# Patient Record
Sex: FEMALE | Race: WHITE | NOT HISPANIC OR LATINO | Employment: PART TIME | URBAN - METROPOLITAN AREA
[De-identification: names, ages, dates, MRNs, and addresses within clinical notes are randomized per-mention and may not be internally consistent; named-entity substitution may affect disease eponyms.]

---
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
## 2020-11-07 ENCOUNTER — OFFICE VISIT (OUTPATIENT)
Dept: URGENT CARE | Facility: CLINIC | Age: 20
End: 2020-11-07
Payer: COMMERCIAL

## 2020-11-07 VITALS
SYSTOLIC BLOOD PRESSURE: 115 MMHG | TEMPERATURE: 99.1 F | RESPIRATION RATE: 16 BRPM | DIASTOLIC BLOOD PRESSURE: 60 MMHG | BODY MASS INDEX: 36.11 KG/M2 | WEIGHT: 172 LBS | OXYGEN SATURATION: 100 % | HEIGHT: 58 IN | HEART RATE: 88 BPM

## 2020-11-07 DIAGNOSIS — J35.8 TONSIL STONE: ICD-10-CM

## 2020-11-07 DIAGNOSIS — J02.9 ACUTE PHARYNGITIS, UNSPECIFIED ETIOLOGY: Primary | ICD-10-CM

## 2020-11-07 PROBLEM — L73.2 HIDRADENITIS SUPPURATIVA: Status: ACTIVE | Noted: 2017-01-13

## 2020-11-07 LAB — S PYO AG THROAT QL: NEGATIVE

## 2020-11-07 PROCEDURE — 87147 CULTURE TYPE IMMUNOLOGIC: CPT | Performed by: FAMILY MEDICINE

## 2020-11-07 PROCEDURE — 87070 CULTURE OTHR SPECIMN AEROBIC: CPT | Performed by: FAMILY MEDICINE

## 2020-11-07 PROCEDURE — 99213 OFFICE O/P EST LOW 20 MIN: CPT | Performed by: FAMILY MEDICINE

## 2020-11-09 ENCOUNTER — TELEPHONE (OUTPATIENT)
Dept: URGENT CARE | Facility: CLINIC | Age: 20
End: 2020-11-09

## 2020-11-09 DIAGNOSIS — J02.0 STREP PHARYNGITIS: Primary | ICD-10-CM

## 2020-11-09 LAB — BACTERIA THROAT CULT: ABNORMAL

## 2020-11-09 RX ORDER — PENICILLIN V POTASSIUM 500 MG/1
500 TABLET ORAL EVERY 12 HOURS
Qty: 20 TABLET | Refills: 0 | Status: SHIPPED | OUTPATIENT
Start: 2020-11-09 | End: 2020-11-19

## 2021-01-23 ENCOUNTER — OFFICE VISIT (OUTPATIENT)
Dept: URGENT CARE | Facility: CLINIC | Age: 21
End: 2021-01-23
Payer: COMMERCIAL

## 2021-01-23 VITALS — OXYGEN SATURATION: 99 % | TEMPERATURE: 98 F | HEART RATE: 112 BPM

## 2021-01-23 DIAGNOSIS — R11.0 NAUSEA: Primary | ICD-10-CM

## 2021-01-23 PROCEDURE — 99213 OFFICE O/P EST LOW 20 MIN: CPT | Performed by: NURSE PRACTITIONER

## 2021-01-23 RX ORDER — ONDANSETRON 4 MG/1
4 TABLET, ORALLY DISINTEGRATING ORAL EVERY 6 HOURS PRN
Qty: 10 TABLET | Refills: 0 | Status: SHIPPED | OUTPATIENT
Start: 2021-01-23

## 2021-01-23 RX ORDER — ARIPIPRAZOLE 5 MG/1
5 TABLET ORAL DAILY
COMMUNITY
Start: 2021-01-14

## 2021-01-23 NOTE — PATIENT INSTRUCTIONS
Zofran every 6 hours as needed for nausea  Wait 30 min after taking nausea medication to eat or drink  Increase fluid intake   Eat bland diet and advance as tolerated  Tylenol/Motrin as needed    Acute Nausea and Vomiting   WHAT YOU NEED TO KNOW:   Acute nausea and vomiting start suddenly, worsen quickly, and last a short time  DISCHARGE INSTRUCTIONS:   Return to the emergency department if:   · You see blood in your vomit or your bowel movements  · You have sudden, severe pain in your chest and upper abdomen after hard vomiting or retching  · You have swelling in your neck and chest      · You are dizzy, cold, and thirsty and your eyes and mouth are dry  · You are urinating very little or not at all  · You have muscle weakness, leg cramps, and trouble breathing  · Your heart is beating much faster than normal      · You continue to vomit for more than 48 hours  Contact your healthcare provider if:   · You have frequent dry heaves (vomiting but nothing comes out)  · Your nausea and vomiting does not get better or go away after you use medicine  · You have questions or concerns about your condition or treatment  Medicines: You may need any of the following:  · Medicines  may be given to calm your stomach and stop your vomiting  You may also need medicines to help you feel more relaxed or to stop nausea and vomiting caused by motion sickness  · Gastrointestinal stimulants  are used to help empty your stomach and bowels  This may help decrease nausea and vomiting  · Take your medicine as directed  Contact your healthcare provider if you think your medicine is not helping or if you have side effects  Tell him or her if you are allergic to any medicine  Keep a list of the medicines, vitamins, and herbs you take  Include the amounts, and when and why you take them  Bring the list or the pill bottles to follow-up visits   Carry your medicine list with you in case of an emergency  Prevent or manage acute nausea and vomiting:   · Do not drink alcohol  Alcohol may upset or irritate your stomach  Too much alcohol can also cause acute nausea and vomiting  · Control stress  Headaches due to stress may cause nausea and vomiting  Find ways to relax and manage your stress  Get more rest and sleep  · Drink more liquids as directed  Vomiting can lead to dehydration  It is important to drink more liquids to help replace lost body fluids  Ask your healthcare provider how much liquid to drink each day and which liquids are best for you  Your provider may recommend that you drink an oral rehydration solution (ORS)  ORS contains water, salts, and sugar that are needed to replace the lost body fluids  Ask what kind of ORS to use, how much to drink, and where to get it  · Eat smaller meals, more often  Eat small amounts of food every 2 to 3 hours, even if you are not hungry  Food in your stomach may decrease your nausea  · Talk to your healthcare provider before you take over-the-counter (OTC) medicines  These medicines can cause serious problems if you use certain other medicines, or you have a medical condition  You may have problems if you use too much or use them for longer than the label says  Follow directions on the label carefully  Follow up with your healthcare provider as directed:  Write down your questions so you remember to ask them during your follow-up visits  © Copyright [address] Information is for End User's use only and may not be sold, redistributed or otherwise used for commercial purposes  All illustrations and images included in CareNotes® are the copyrighted property of A D A M , Inc  or [facility] Reinier Zhang   The above information is an  only  It is not intended as medical advice for individual conditions or treatments   Talk to your doctor, nurse or pharmacist before following any medical regimen to see if it is safe and effective for you

## 2021-01-23 NOTE — PROGRESS NOTES
[address] [facility]        NAME: Rocío Hutchins is a 21 y o  female  : 2000    MRN: [id number]  DATE: 2021  TIME: 4:00 PM    Assessment and Plan   Nausea [R11 0]  1  Nausea  ondansetron (ZOFRAN-ODT) 4 mg disintegrating tablet         Patient Instructions   Zofran every 6 hours as needed for nausea  Wait 30 min after taking nausea medication to eat or drink  Increase fluid intake   Eat bland diet and advance as tolerated  Tylenol/Motrin as needed      Follow up with PCP in 3-5 days  Proceed to  ER if symptoms worsen  Chief Complaint     Chief Complaint   Patient presents with    Nausea     1 NIGHT, SENT HOME FROM WORK         History of Present Illness       Patient is a 21year old female presenting with nausea and upset stomach that started this morning  She left work early and was advised to come to urgent care  Denies fever, chills, cough, SOB, V/D  No medications for symptom relief  Review of Systems   Review of Systems   Constitutional: Negative for activity change, chills and fever  Respiratory: Negative for cough and shortness of breath  Gastrointestinal: Positive for nausea  Negative for abdominal pain, diarrhea and vomiting  Skin: Negative for rash  Neurological: Negative for dizziness, seizures and headaches           Current Medications       Current Outpatient Medications:     ARIPiprazole (ABILIFY) 5 mg tablet, Take 5 mg by mouth daily, Disp: , Rfl:     ondansetron (ZOFRAN-ODT) 4 mg disintegrating tablet, Take 1 tablet (4 mg total) by mouth every 6 (six) hours as needed for nausea or vomiting, Disp: 10 tablet, Rfl: 0    Current Allergies     Allergies as of 2021 - Reviewed 2021   Allergen Reaction Noted    Sulfa antibiotics Hives 08/10/2016            The following portions of the patient's history were reviewed and updated as appropriate: allergies, current medications, past family history, past medical history, past social history, past surgical history and problem list      Past Medical History:   Diagnosis Date    Anxiety     per grandmother, pt has panic attacks     Asthma     Depression     Hidradenitis suppurativa     Paronychia of toe     Psychiatric disorder     anxiety, depression       Past Surgical History:   Procedure Laterality Date    [place] EXC SWEAT GLAND LESN AXILL,SIMPL Bilateral 12/11/2017    Procedure: EXCISION HIDRADENITIS AXILLARY WITH COMPLEX CLOSURE;  Surgeon: Jere Youssef MD;  Location: [facility] OR;  Service: Plastics       Family History   Problem Relation Age of Onset    No Known Problems Mother     Heart disease Father     Kidney disease Father     Liver disease Father     Early death Father     Breast cancer Maternal Grandmother     Skin cancer Maternal Grandmother     Breast cancer Paternal Grandmother          Medications have been verified  Objective   Pulse (!) 112   Temp 98 °F (36 7 °C) (Temporal)   SpO2 99%        Physical Exam     Physical Exam  Vitals signs reviewed  Constitutional:       General: She is awake  She is not in acute distress  Appearance: Normal appearance  HENT:      Head: Normocephalic  Cardiovascular:      Rate and Rhythm: Regular rhythm  Tachycardia present  Heart sounds: Normal heart sounds, S1 normal and S2 normal    Pulmonary:      Effort: Pulmonary effort is normal       Breath sounds: Normal breath sounds  No decreased breath sounds, wheezing, rhonchi or rales  Abdominal:      General: Bowel sounds are normal       Palpations: Abdomen is soft  Tenderness: There is generalized abdominal tenderness  Skin:     General: Skin is warm and moist    Neurological:      General: No focal deficit present  Mental Status: She is alert, oriented to person, place, and time and easily aroused  Psychiatric:         Behavior: Behavior is cooperative

## 2021-01-23 NOTE — LETTER
January 23, 2021     Patient: Rocío Hutchins   YOB: 2000   Date of Visit: 1/23/2021       To Whom it May Concern:    Jose Ibrahimlynda was seen in my clinic on 1/23/2021  She may return to work on 1/24/2021  If you have any questions or concerns, please don't hesitate to call  Sincerely,          BE AILIN WISE        CC: Parisa Singleton

## 2021-04-22 ENCOUNTER — OFFICE VISIT (OUTPATIENT)
Dept: URGENT CARE | Facility: CLINIC | Age: 21
End: 2021-04-22
Payer: COMMERCIAL

## 2021-04-22 VITALS
RESPIRATION RATE: 16 BRPM | SYSTOLIC BLOOD PRESSURE: 112 MMHG | OXYGEN SATURATION: 100 % | DIASTOLIC BLOOD PRESSURE: 62 MMHG | HEART RATE: 85 BPM | HEIGHT: 59 IN | TEMPERATURE: 98 F | WEIGHT: 170 LBS | BODY MASS INDEX: 34.27 KG/M2

## 2021-04-22 DIAGNOSIS — K52.9 NONINFECTIOUS GASTROENTERITIS, UNSPECIFIED TYPE: Primary | ICD-10-CM

## 2021-04-22 PROCEDURE — 99213 OFFICE O/P EST LOW 20 MIN: CPT | Performed by: NURSE PRACTITIONER

## 2021-04-22 NOTE — LETTER
April 22, 2021     Patient: Efraín Mayo   YOB: 2000   Date of Visit: 4/22/2021       To Whom it May Concern:    Jayshree Batista was seen in my clinic on 4/22/2021  Please excuse from work today due to illness  May stay home tomorrow if not feeling better, otherwise may return to work  If you have any questions or concerns, please don't hesitate to call  Sincerely,          BE AILIN WISE        CC: Parisa Gomez

## 2021-04-22 NOTE — PATIENT INSTRUCTIONS
--Frequent fluids, advance to bland solids as tolerated (bananas, rice, crackers, yogurt, etc)  --OTC Pepto-bismol as needed  --Avoid further sushi, other potentially exacerbating foods, especially spicy, greasy, acidic  --Seek re-evaluation if no improvement/resolution over the next 2-3 days  --Go to ER for worsening pain, vomiting, fever/chills

## 2021-04-22 NOTE — PROGRESS NOTES
[email or web address]        NAME: Ellen King is a 24 y o  female  : 2000    MRN: [id number]  DATE: 2021  TIME: 11:11 AM    Assessment and Plan   Noninfectious gastroenteritis, unspecified type [K52 9]  1  Noninfectious gastroenteritis, unspecified type       --Suspect food (sushi) induced gastroenteritis  Address per below  Patient Instructions     --Frequent fluids, advance to bland solids as tolerated (bananas, rice, crackers, yogurt, etc)  --OTC Pepto-bismol as needed  --Avoid further sushi, other potentially exacerbating foods, especially spicy, greasy, acidic  --Seek re-evaluation if no improvement/resolution over the next 2-3 days  --Go to ER for worsening pain, vomiting, fever/chills  Chief Complaint     Chief Complaint   Patient presents with    Abdominal Pain     Started last night after diner feels like she has to vomit          History of Present Illness         Here with complaints of generalized abdominal pain since last night  Described as "aching", "rolling" sensation  Waxes and wanes  Non-localized, with no radiation to back, elsewhere  Started 1-2 hours after eating raw "gas station" sushi, which she thinks was likely the cause  No other known food precipitants or infectious contacts  Some nausea, loose stools also  No fever/chills, vomiting, hematochezia/melena, urinary complaints  No bloating, burping, heartburn  No URI symptoms  Drinking adequately  Had some crackers today  Feels somewhat better  Rates pain 4/10 at present  No OTC meds taken  Denies previous GI conditions/surgeries including IBS, lactose/gluten intolerance, etc    No recent alcohol  Review of Systems   Review of Systems   Constitutional: Negative for fever  Gastrointestinal:        Per HPI   Genitourinary: Negative for difficulty urinating and dysuria  Skin: Negative for rash           Current Medications       Current Outpatient Medications:     ARIPiprazole (ABILIFY) 5 mg tablet, Take 5 mg by mouth daily, Disp: , Rfl:     ondansetron (ZOFRAN-ODT) 4 mg disintegrating tablet, Take 1 tablet (4 mg total) by mouth every 6 (six) hours as needed for nausea or vomiting, Disp: 10 tablet, Rfl: 0    Current Allergies     Allergies as of 04/22/2021 - Reviewed 04/22/2021   Allergen Reaction Noted    Sulfa antibiotics Hives 08/10/2016            The following portions of the patient's history were reviewed and updated as appropriate: allergies, current medications, past family history, past medical history, past social history, past surgical history and problem list      Past Medical History:   Diagnosis Date    Anxiety     per grandmother, pt has panic attacks     Asthma     Depression     Hidradenitis suppurativa     Paronychia of toe     Psychiatric disorder     anxiety, depression       Past Surgical History:   Procedure Laterality Date    [place] EXC SWEAT GLAND LESN AXILL,SIMPL Bilateral 12/11/2017    Procedure: EXCISION HIDRADENITIS AXILLARY WITH COMPLEX CLOSURE;  Surgeon: Raquel Coronado MD;  Location: [facility];  Service: Plastics       Family History   Problem Relation Age of Onset    No Known Problems Mother     Heart disease Father     Kidney disease Father     Liver disease Father     Early death Father     Breast cancer Maternal Grandmother     Skin cancer Maternal Grandmother     Breast cancer Paternal Grandmother          Medications have been verified  Objective   /62 (BP Location: Right arm, Patient Position: Sitting)   Pulse 85   Temp 98 °F (36 7 °C)   Resp 16   Ht 4' 11" (1 499 m)   Wt 77 1 kg (170 lb)   SpO2 100%   BMI 34 34 kg/m²   No LMP recorded  Physical Exam     Physical Exam  Constitutional:       General: She is not in acute distress  Appearance: She is not ill-appearing, toxic-appearing or diaphoretic  Eyes:      General: No scleral icterus  Cardiovascular:      Rate and Rhythm: Normal rate and regular rhythm  Pulmonary:      Effort: Pulmonary effort is normal       Breath sounds: Normal breath sounds  Abdominal:      General: Abdomen is flat  Bowel sounds are normal  There is no distension  Palpations: Abdomen is soft  There is no mass  Tenderness: There is abdominal tenderness  There is no right CVA tenderness, left CVA tenderness, guarding or rebound  Hernia: No hernia is present  Comments: Bilateral mid and upper abdominal tenderness without guarding, rebound  No lower abdominal tenderness  Negative psoas, obturator, Rovsing  Normal BS  Neurological:      Mental Status: She is alert     Psychiatric:         Mood and Affect: Mood normal

## 2021-09-08 ENCOUNTER — DOCUMENTATION (OUTPATIENT)
Dept: DENTISTRY | Facility: CLINIC | Age: 21
End: 2021-09-08

## 2021-09-08 NOTE — PROGRESS NOTES
Pt no showed for her dental appt scheduled on 9/8/2021 at 2:00pm  Pt was scheduled for periodic dental exam, adult prophy and updated radiographs  Pt's last prophy appt was on 9/16/2020       [facility]

## 2022-10-18 ENCOUNTER — ANNUAL EXAM (OUTPATIENT)
Dept: OBGYN CLINIC | Facility: CLINIC | Age: 22
End: 2022-10-18
Payer: MEDICARE

## 2022-10-18 VITALS
SYSTOLIC BLOOD PRESSURE: 122 MMHG | DIASTOLIC BLOOD PRESSURE: 82 MMHG | HEIGHT: 59 IN | WEIGHT: 186.7 LBS | BODY MASS INDEX: 37.64 KG/M2

## 2022-10-18 DIAGNOSIS — R63.5 WEIGHT GAIN: ICD-10-CM

## 2022-10-18 DIAGNOSIS — Z12.4 ENCOUNTER FOR SCREENING FOR MALIGNANT NEOPLASM OF CERVIX: ICD-10-CM

## 2022-10-18 DIAGNOSIS — Z01.419 WELL WOMAN EXAM WITH ROUTINE GYNECOLOGICAL EXAM: Primary | ICD-10-CM

## 2022-10-18 DIAGNOSIS — L68.0 HIRSUTISM: ICD-10-CM

## 2022-10-18 DIAGNOSIS — N91.2 AMENORRHEA: ICD-10-CM

## 2022-10-18 PROCEDURE — 99395 PREV VISIT EST AGE 18-39: CPT | Performed by: PHYSICIAN ASSISTANT

## 2022-10-18 PROCEDURE — G0145 SCR C/V CYTO,THINLAYER,RESCR: HCPCS | Performed by: PHYSICIAN ASSISTANT

## 2022-10-18 NOTE — PROGRESS NOTES
ASSESSMENT & PLAN:   Diagnoses and all orders for this visit:    Well woman exam with routine gynecological exam  -     Cancel: Liquid-based pap, screening  -     Liquid-based pap, screening    Encounter for screening for malignant neoplasm of cervix  -     Cancel: Liquid-based pap, screening  -     Liquid-based pap, screening    Amenorrhea  -     Follicle stimulating hormone; Future  -     hCG, quantitative; Future  -     TSH, 3rd generation with Free T4 reflex; Future  -     Prolactin; Future  -     US pelvis transabdominal only; Future    Hirsutism  -     Basic metabolic panel; Future  -     Insulin, fasting; Future  -     Glucose, fasting; Future  -     HEMOGLOBIN A1C W/ EAG ESTIMATION; Future    Weight gain  -     Basic metabolic panel; Future  -     Insulin, fasting; Future  -     Glucose, fasting; Future  -     HEMOGLOBIN A1C W/ EAG ESTIMATION; Future          The following were reviewed in today's visit: ASCCP guidelines, Gardisil vaccination, STD testing breast self exam, STD testing, exercise and healthy diet  Reviewed importance of regulation of menstrual period and use of hormonal contraception to do so for uterine protection  Patient is not amenable at this time to any intervention and states she is planning to get a hysterectomy in the future  Patient to return to office in yearly for annual exam      All questions have been answered to her satisfaction  CC:  Annual Gynecologic Examination  Chief Complaint   Patient presents with   • Gynecologic Exam       HPI: Lola Field is a 25 y o  [place] who presents for annual gynecologic examination  She has the following concerns:  Patient states she has not had a period in over a year  She had irregular periods for 5-7 years prior to that  She also reports difficulty losing weight and male pattern facial hair growth  She is sexually active with a male partner, but has never had any vaginal penetration   She used a tampon once in her teens which was painful and has not used one since  Health Maintenance:    Exercise: intermittently  Breast exams/breast awareness: yes  Diet: mostly well balanced      Past Medical History:   Diagnosis Date   • Anxiety     per grandmother, pt has panic attacks    • Asthma    • Depression    • Hidradenitis suppurativa    • Paronychia of toe    • Psychiatric disorder     anxiety, depression       Past Surgical History:   Procedure Laterality Date   • CO EXC SWEAT GLAND LESN AXILL,SIMPL Bilateral 12/11/2017    Procedure: EXCISION HIDRADENITIS AXILLARY WITH COMPLEX CLOSURE;  Surgeon: Liana Bragg MD;  Location: [facility] OR;  Service: Plastics       Past OB/Gyn History:   No LMP recorded (lmp unknown)  Last Pap: no priors : no prior  History of abnormal Pap smear: no  HPV vaccine completed: yes    Patient is  currently sexually active     STD testing: no  Current contraception: none      Family History  Family History   Problem Relation Age of Onset   • No Known Problems Mother    • Heart disease Father    • Kidney disease Father    • Liver disease Father    • Early death Father    • Breast cancer Maternal Grandmother    • Skin cancer Maternal Grandmother    • Uterine cancer Maternal Grandmother    • Breast cancer Paternal Grandmother        Family history of uterine or ovarian cancer: yes  Family history of breast cancer: yes  Family history of colon cancer: no    Social History:  Social History     Socioeconomic History   • Marital status: Single     Spouse name: Not on file   • Number of children: Not on file   • Years of education: Not on file   • Highest education level: Not on file   Occupational History   • Not on file   Tobacco Use   • Smoking status: Never Smoker   • Smokeless tobacco: Never Used   Vaping Use   • Vaping Use: Some days   • Substances: THC (vapes medical THC)   Substance and Sexual Activity   • Alcohol use: Yes     Comment: social   • Drug use: Yes     Types: Marijuana   • Sexual activity: Never Other Topics Concern   • Not on file   Social History Narrative   • Not on file     Social Determinants of Health     Financial Resource Strain: Not on file   Food Insecurity: Not on file   Transportation Needs: Not on file   Physical Activity: Not on file   Stress: Not on file   Social Connections: Not on file   Intimate Partner Violence: Not on file   Housing Stability: Not on file     Domestic violence screen: negative    Allergies: Allergies   Allergen Reactions   • Sulfa Antibiotics Hives       Medications:  No current outpatient medications on file  Review of Systems:  Review of Systems   Constitutional: Negative for chills, fever and unexpected weight change  Respiratory: Negative for shortness of breath  Cardiovascular: Negative for chest pain  Gastrointestinal: Negative for abdominal pain, diarrhea, nausea and vomiting  Skin: Negative for rash  Psychiatric/Behavioral: Negative for dysphoric mood  The patient is not nervous/anxious  Physical Exam:  /82 (BP Location: Right arm, Patient Position: Sitting, Cuff Size: Standard)   Ht 4' 11" (1 499 m)   Wt 84 7 kg (186 lb 11 2 oz)   LMP  (LMP Unknown) Comment: last period over 1 year ago   BMI 37 71 kg/m²         Physical Exam  Constitutional:       Appearance: Normal appearance  She is obese  Genitourinary:      Vulva and urethral meatus normal       No lesions in the vagina  Right Labia: No rash or lesions  Left Labia: No lesions or rash  No vaginal discharge, erythema or bleeding  Right Adnexa: not tender and no mass present  Left Adnexa: not tender and no mass present  No cervical discharge or lesion  Uterus is not tender  Uterus exam comments: Exam limited by habitus  Breasts: Breasts are symmetrical       Right: No mass, skin change or axillary adenopathy  Left: No mass, skin change or axillary adenopathy  HENT:      Head: Normocephalic and atraumatic        Comments: Chin/neck/facial hair noted, dark  Cardiovascular:      Rate and Rhythm: Normal rate and regular rhythm  Heart sounds: Normal heart sounds  No murmur heard  No friction rub  No gallop  Pulmonary:      Effort: Pulmonary effort is normal       Breath sounds: Normal breath sounds  No wheezing, rhonchi or rales  Abdominal:      General: Abdomen is flat  There is no distension  Palpations: Abdomen is soft  Tenderness: There is no abdominal tenderness  Musculoskeletal:      Cervical back: Neck supple  Lymphadenopathy:      Upper Body:      Right upper body: No axillary adenopathy  Left upper body: No axillary adenopathy  Neurological:      General: No focal deficit present  Mental Status: She is alert  Skin:     General: Skin is warm and dry  Psychiatric:         Mood and Affect: Mood normal          Behavior: Behavior normal    Vitals reviewed

## 2022-10-24 LAB
LAB AP GYN PRIMARY INTERPRETATION: NORMAL
Lab: NORMAL

## 2023-07-26 LAB — HBA1C MFR BLD HPLC: 5 %

## 2024-02-21 PROBLEM — J02.9 ACUTE PHARYNGITIS: Status: RESOLVED | Noted: 2020-11-07 | Resolved: 2024-02-21

## 2024-03-25 ENCOUNTER — TELEPHONE (OUTPATIENT)
Age: 24
End: 2024-03-25

## 2024-03-25 NOTE — TELEPHONE ENCOUNTER
Per Plastics office, pt needs to see general surgery first then plastics would do the closure    Called pt to cancel appt. Pt verbally acknowledged.

## 2024-06-17 ENCOUNTER — HOSPITAL ENCOUNTER (EMERGENCY)
Facility: HOSPITAL | Age: 24
Discharge: HOME | End: 2024-06-17
Attending: EMERGENCY MEDICINE
Payer: MEDICARE

## 2024-06-17 VITALS
HEART RATE: 97 BPM | DIASTOLIC BLOOD PRESSURE: 63 MMHG | TEMPERATURE: 97.8 F | OXYGEN SATURATION: 99 % | RESPIRATION RATE: 18 BRPM | SYSTOLIC BLOOD PRESSURE: 116 MMHG

## 2024-06-17 DIAGNOSIS — L02.412 ABSCESS OF LEFT AXILLA: Primary | ICD-10-CM

## 2024-06-17 DIAGNOSIS — Z87.2 HISTORY OF HIDRADENITIS SUPPURATIVA: ICD-10-CM

## 2024-06-17 PROCEDURE — 10061 I&D ABSCESS COMP/MULTIPLE: CPT | Performed by: EMERGENCY MEDICINE

## 2024-06-17 PROCEDURE — 99284 EMERGENCY DEPT VISIT MOD MDM: CPT | Performed by: EMERGENCY MEDICINE

## 2024-06-17 PROCEDURE — 99282 EMERGENCY DEPT VISIT SF MDM: CPT

## 2024-06-17 PROCEDURE — 96372 THER/PROPH/DIAG INJ SC/IM: CPT

## 2024-06-17 RX ORDER — DOXYCYCLINE HYCLATE 100 MG/1
100 CAPSULE ORAL 2 TIMES DAILY
Qty: 14 CAPSULE | Refills: 0 | Status: SHIPPED | OUTPATIENT
Start: 2024-06-17 | End: 2024-06-24

## 2024-06-17 RX ORDER — HYDROMORPHONE HCL/PF 1 MG/ML
0.5 SYRINGE (ML) INJECTION ONCE
Status: DISCONTINUED | OUTPATIENT
Start: 2024-06-17 | End: 2024-06-17 | Stop reason: HOSPADM

## 2024-06-17 RX ORDER — KETOROLAC TROMETHAMINE 30 MG/ML
15 INJECTION, SOLUTION INTRAMUSCULAR; INTRAVENOUS ONCE
Status: COMPLETED | OUTPATIENT
Start: 2024-06-17 | End: 2024-06-17

## 2024-06-17 RX ORDER — LIDOCAINE HYDROCHLORIDE AND EPINEPHRINE 10; 10 MG/ML; UG/ML
20 INJECTION, SOLUTION INFILTRATION; PERINEURAL ONCE
Status: COMPLETED | OUTPATIENT
Start: 2024-06-17 | End: 2024-06-17

## 2024-06-17 RX ORDER — HYDROMORPHONE HCL/PF 1 MG/ML
0.5 SYRINGE (ML) INJECTION ONCE
Status: DISCONTINUED | OUTPATIENT
Start: 2024-06-17 | End: 2024-06-17

## 2024-06-17 RX ORDER — IBUPROFEN 800 MG/1
800 TABLET ORAL EVERY 8 HOURS PRN
Qty: 12 TABLET | Refills: 0 | Status: SHIPPED | OUTPATIENT
Start: 2024-06-17

## 2024-06-17 RX ADMIN — KETOROLAC TROMETHAMINE 15 MG: 30 INJECTION, SOLUTION INTRAMUSCULAR; INTRAVENOUS at 13:28

## 2024-06-17 RX ADMIN — LIDOCAINE HYDROCHLORIDE,EPINEPHRINE BITARTRATE 20 ML: 10; .01 INJECTION, SOLUTION INFILTRATION; PERINEURAL at 13:31

## 2024-06-17 NOTE — ED PROVIDER NOTES
History  Chief Complaint   Patient presents with    Mass     Patient has hx of  Hidradenitis suppurativa, reports lump under L armpit since 6/13, has noticed that it has been fluctuating in size. Pain noted to the area     24 year old female presents with 5 day history of worsening left axilla pain.  Patient has a hx of HS with recurrent axillary abscesses and had previous left axillary surgery in 2017.  Patient reports 8/10 pain, no fever, no drainage.  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      History provided by:  Medical records and patient   used: No    Abscess  Location:  Shoulder/arm  Shoulder/arm abscess location:  L axilla  Size:  1 cm  Abscess quality: induration, painful, redness and warmth    Abscess quality: not draining, no fluctuance and not weeping    Red streaking: no    Duration:  5 days  Progression:  Worsening  Pain details:     Quality:  Pressure    Severity:  Severe    Duration:  4 days    Timing:  Constant    Progression:  Worsening  Chronicity:  Recurrent  Context: not diabetes and not injected drug use    Relieved by:  Nothing  Worsened by:  Nothing  Ineffective treatments:  Warm compresses  Associated symptoms: no fever, no nausea and no vomiting    Risk factors: prior abscess        None       Past Medical History:   Diagnosis Date    Anxiety     per grandmother, pt has panic attacks     Asthma     Depression     Hidradenitis suppurativa     Paronychia of toe     Psychiatric disorder     anxiety, depression       Past Surgical History:   Procedure Laterality Date    GA EXCISION HIDRADENITIS AXILLARY SMPL/INTRM RPR Bilateral 12/11/2017    Procedure: EXCISION HIDRADENITIS AXILLARY WITH COMPLEX CLOSURE;  Surgeon: Gonzalez Burgos MD;  Location:  MAIN OR;  Service: Plastics       Family History   Problem Relation Age of Onset    No Known Problems Mother     Heart disease Father      Kidney disease Father     Liver disease Father     Early death Father     Breast cancer Maternal Grandmother     Skin cancer Maternal Grandmother     Uterine cancer Maternal Grandmother     Breast cancer Paternal Grandmother      I have reviewed and agree with the history as documented.    E-Cigarette/Vaping    E-Cigarette Use Current Some Day User      E-Cigarette/Vaping Substances    THC Yes vapes medical THC     Social History     Tobacco Use    Smoking status: Never    Smokeless tobacco: Never   Vaping Use    Vaping status: Some Days    Substances: THC (vapes medical THC)   Substance Use Topics    Alcohol use: Not Currently     Comment: social    Drug use: Yes     Types: Marijuana       Review of Systems   Constitutional:  Negative for chills and fever.   Gastrointestinal:  Negative for nausea and vomiting.   Skin:  Negative for rash and wound.   Neurological:  Positive for numbness. Negative for weakness.   All other systems reviewed and are negative.      Physical Exam  Physical Exam  Vitals and nursing note reviewed.   Constitutional:       General: She is in acute distress.      Appearance: She is well-developed. She is not toxic-appearing.   HENT:      Head: Normocephalic.      Right Ear: External ear normal.      Left Ear: External ear normal.      Nose: Nose normal.      Mouth/Throat:      Mouth: Mucous membranes are moist.      Pharynx: Oropharynx is clear.   Eyes:      General: Lids are normal.      Extraocular Movements: Extraocular movements intact.      Pupils: Pupils are equal, round, and reactive to light.   Pulmonary:      Effort: Pulmonary effort is normal. No respiratory distress.   Musculoskeletal:         General: No deformity. Normal range of motion.      Cervical back: Normal range of motion and neck supple.   Skin:     General: Skin is warm and dry.      Findings: Abscess and erythema present.             Comments: Moderate amount of scar tissue in left axilla   Neurological:      Mental  Status: She is alert and oriented to person, place, and time.   Psychiatric:         Mood and Affect: Mood is anxious.         Thought Content: Thought content normal.         Cognition and Memory: Cognition normal.         Judgment: Judgment normal.         Vital Signs  ED Triage Vitals   Temperature Pulse Respirations Blood Pressure SpO2   06/17/24 1145 06/17/24 1145 06/17/24 1145 06/17/24 1145 06/17/24 1145   97.8 °F (36.6 °C) 97 18 116/63 99 %      Temp Source Heart Rate Source Patient Position - Orthostatic VS BP Location FiO2 (%)   06/17/24 1145 06/17/24 1145 06/17/24 1145 06/17/24 1145 --   Oral Monitor Sitting Right arm       Pain Score       06/17/24 1328       8           Vitals:    06/17/24 1145   BP: 116/63   Pulse: 97   Patient Position - Orthostatic VS: Sitting         Visual Acuity      ED Medications  Medications   HYDROmorphone (DILAUDID) injection 0.5 mg (0.5 mg Intramuscular Not Given 6/17/24 1335)   ketorolac (TORADOL) injection 15 mg (15 mg Intramuscular Given 6/17/24 1328)   lidocaine-epinephrine (XYLOCAINE/EPINEPHRINE) 1 %-1:100,000 injection 20 mL (20 mL Infiltration Given by Other 6/17/24 1331)       Diagnostic Studies  Results Reviewed       None                   No orders to display              Procedures  Incision and drain    Date/Time: 6/17/2024 1:49 PM    Performed by: Conchita Rizzo DO  Authorized by: Conchita Rizzo DO  Universal Protocol:  Consent: Verbal consent obtained.  Risks and benefits: risks, benefits and alternatives were discussed  Consent given by: patient  Patient identity confirmed: verbally with patient    Patient location:  ED  Location:     Type:  Abscess    Size:  1cm    Location:  Upper extremity    Upper extremity location: left axilla.  Pre-procedure details:     Skin preparation:  Betadine  Anesthesia (see MAR for exact dosages):     Anesthesia method:  Local infiltration    Local anesthetic:  Lidocaine 1% WITH epi  Procedure details:     Complexity:  Simple     Needle aspiration: no      Incision types:  Single straight    Scalpel blade:  11    Approach:  Open    Incision depth:  Subcutaneous    Wound management:  Probed and deloculated and irrigated with saline    Irrigation with saline:  100cc    Drainage:  Purulent    Drainage amount:  Moderate    Wound treatment:  Wound left open    Packing materials:  None  Post-procedure details:     Patient tolerance of procedure:  Tolerated well, no immediate complications           ED Course                                             Medical Decision Making  24-year-old female presents with left axillary pain.  Patient has history of hydroadenitis suppurativa.  Differential diagnosis includes was limited to acute skin abscess, cellulitis, lymphadenitis.    Problems Addressed:  Abscess of left axilla: acute illness or injury  History of hidradenitis suppurativa: chronic illness or injury    Amount and/or Complexity of Data Reviewed  Independent Historian:      Details: Significant other at bedside who provide history  External Data Reviewed: notes.     Details: Notes from prior plastic surgery evaluations reviewed by me    Risk  Prescription drug management.  Risk Details: 24-year-old female presents with left axillary pain.  Patient was found to have a small area of abscess overlying a pre-existing area of scar tissue.  Patient tolerated local anesthesia and linear incision.  Moderate amount of purulent drainage expressed from abscess collection.  Patient tolerated well.  Recommend warm soaks, no packing placed as the area of fluctuance was very shallow and overlying firm scar tissue limiting the ability to place packing.  Discussed signs symptoms to return to the emergency department.  Will initiate oral antibiotics and ibuprofen as needed for pain.             Disposition  Final diagnoses:   Abscess of left axilla   History of hidradenitis suppurativa     Time reflects when diagnosis was documented in both MDM as applicable and  the Disposition within this note       Time User Action Codes Description Comment    6/17/2024  1:51 PM Conchita Rizzo [L02.412] Abscess of left axilla     6/17/2024  1:52 PM Conchita Rizzo [Z87.2] History of hidradenitis suppurativa           ED Disposition       ED Disposition   Discharge    Condition   Stable    Date/Time   Mon Jun 17, 2024  1:51 PM    Comment   Parisa West discharge to home/self care.                   Follow-up Information    None         Discharge Medication List as of 6/17/2024  1:56 PM        START taking these medications    Details   doxycycline hyclate (VIBRAMYCIN) 100 mg capsule Take 1 capsule (100 mg total) by mouth 2 (two) times a day for 7 days, Starting Mon 6/17/2024, Until Mon 6/24/2024, Normal      ibuprofen (MOTRIN) 800 mg tablet Take 1 tablet (800 mg total) by mouth every 8 (eight) hours as needed for mild pain or moderate pain Take with food, Starting Mon 6/17/2024, Normal                 PDMP Review       None            ED Provider  Electronically Signed by             Conchita Rizzo DO  06/17/24 6337

## 2024-06-17 NOTE — Clinical Note
Parisa West was seen and treated in our emergency department on 6/17/2024.                Diagnosis:     Parisa  may return to work on return date.    She may return on this date: 06/21/2024         If you have any questions or concerns, please don't hesitate to call.      Conchita Rizzo, DO    ______________________________           _______________          _______________  Hospital Representative                              Date                                Time

## 2024-06-20 ENCOUNTER — TELEPHONE (OUTPATIENT)
Dept: PLASTIC SURGERY | Facility: CLINIC | Age: 24
End: 2024-06-20

## 2024-07-16 ENCOUNTER — OFFICE VISIT (OUTPATIENT)
Dept: PLASTIC SURGERY | Facility: CLINIC | Age: 24
End: 2024-07-16
Payer: MEDICARE

## 2024-07-16 DIAGNOSIS — Z87.2 H/O HIDRADENITIS SUPPURATIVA: Primary | ICD-10-CM

## 2024-07-16 DIAGNOSIS — L73.2 HIDRADENITIS SUPPURATIVA OF LEFT AXILLA: ICD-10-CM

## 2024-07-16 PROCEDURE — 99203 OFFICE O/P NEW LOW 30 MIN: CPT

## 2024-07-16 NOTE — PROGRESS NOTES
[facility]astic and [facility]  [address], [address], [place] [address]  [phone]    Patient Identification: Parisa West is a 24 y.o. female     History of Present Illness: The patient is a 24 y.o.  year-old female  who presents to the office for consult regarding left axilla recurrent hidradenitis. Patient is previously known to our practice. She had hidradenitis excision of bilateral axilla 12/11/2017 by Dr. Burgos.   Patient states she has an area of recurrent hidradenitis within the left axilla. Since 2017, the area has occasionally drained fluid. Pt states she was [facility] at [facility] for an incision and drainage of left axilla abscess. I&D was successful and she was discharged home on antibiotics. The area is tender and causes discomfort when wearing a bra. Patient would like to discuss her options at this time. She is interested in excision of hidradenitis and remaining scar. Patient denies factors contributing to poor wound healing such as diabetes, tobacco use, chronic steroid use, immunotherapy/chemotherapy drug use and blood thinning mediations. She denies any cardiac or pulmonary history. She admits to medical marijuana use.     Patient has no complaints at this time.    Past Medical History:   Diagnosis Date    Anxiety     per grandmother, pt has panic attacks     Asthma     Depression     Hidradenitis suppurativa     Paronychia of toe     Psychiatric disorder     anxiety, depression        Review of Systems  Constitutional: Denies fevers, chills or pain.  Skin: Denies any warmth, erythema, edema or mucopurulent drainage.     Physical Exam  Constitutional:AAOx3, well-developed, no distress. Pt is cooperative and pleasant.  Eyes: Pupils are equal, round, and reactive to light. EOM in tact. Clear conjunctiva  Cardiovascular: Normal rate, regular rhythm.  Pulmonary/Chest: Effort normal and breath sounds normal. No respiratory distress.  Neuro: Gait in tact. Reflexes and  motor strength normal and symmetric. Cranial nerved 2-12 grossly intact  Psychiatric: Has appropriate behavior and thought process.   Extremities: Full ROM, no gross abnormalities.  Skin: Right axilla scar well healed, flat, no hyperpigmentation or wounds. Left axilla scar is raised, medial inferior portion of scar with a cystic structure. No active drainage. Superior to axilla is a small cystic structure as well that is inflamed. See media    Assessment and Plan:  The patient is an 24 y.o.  year-old female who presents to the office for consultation for recurrent hidradenitis. S/p Excision Hidradenitis Axillary With Complex Closure - Bilateral  on 12/11/2017 by Dr. Burgos        Diagnosis related to today's visit include:  History of hidradenitis suppurativa  Hidradenitis suppurativa of left axilla    -At today's visit discussed patient's history and bilateral axillas were examined. Right axilla scar is well healed. Left axillary scar has a cystic structure along the medial inferior portion. Scar is raised and firm.  Discussed options of care with the patient, she is interested in revision of left axillary scar and excision of recurrent hidradenitis. Discussed risks of the procedure such as poor wound healing, scarring, and risk of hidradenitis reoccurrence once again. Pt understands, would like to proceed.  -Patient is a candidate for excision of recurrent hidradenitis in a hospital setting. Surgical details were reviewed with her and post-op care and restrictions.   -The patient signed a consent form and reviewed the risks of the procedure, such as bleeding, infection, asymmetry, contour deformity, scarring, wound healing problems, anesthesia risks, as well as possibility of subsequent procedures.   -Our office will contact the patient for scheduling. She is to return for a pre-op appt.  -Patient was advised she would have to refrain from medical marijuana use before and after procedure.   -The patient is to call  the office with any questions or concerns. All of the patient's questions were answered at this time and they agree with the plan of care.      Ivis Alcantar PA-C  [facility] Plastic and Reconstructive Surgery

## 2024-07-25 ENCOUNTER — TELEPHONE (OUTPATIENT)
Dept: PLASTIC SURGERY | Facility: CLINIC | Age: 24
End: 2024-07-25

## 2024-07-30 ENCOUNTER — TELEPHONE (OUTPATIENT)
Dept: PLASTIC SURGERY | Facility: CLINIC | Age: 24
End: 2024-07-30

## 2024-07-30 NOTE — TELEPHONE ENCOUNTER
Spoke with patient to schedule her for surgery with Dr. Dover. Patient wanted to wait until she talks to her employer about taking the time off. Gave her my direct number to call me when she is ready.

## 2024-08-05 ENCOUNTER — PREP FOR PROCEDURE (OUTPATIENT)
Dept: PLASTIC SURGERY | Facility: CLINIC | Age: 24
End: 2024-08-05

## 2024-08-05 DIAGNOSIS — Z87.2 H/O HIDRADENITIS SUPPURATIVA: Primary | ICD-10-CM

## 2024-08-05 DIAGNOSIS — L73.2 HIDRADENITIS AXILLARIS: ICD-10-CM

## 2024-08-28 ENCOUNTER — OFFICE VISIT (OUTPATIENT)
Dept: PLASTIC SURGERY | Facility: CLINIC | Age: 24
End: 2024-08-28
Payer: MEDICARE

## 2024-08-28 VITALS
SYSTOLIC BLOOD PRESSURE: 127 MMHG | BODY MASS INDEX: 34.56 KG/M2 | HEIGHT: 59 IN | WEIGHT: 171.4 LBS | DIASTOLIC BLOOD PRESSURE: 91 MMHG | TEMPERATURE: 98.3 F | HEART RATE: 93 BPM

## 2024-08-28 DIAGNOSIS — L73.2 HIDRADENITIS SUPPURATIVA OF LEFT AXILLA: Primary | ICD-10-CM

## 2024-08-28 PROCEDURE — 99214 OFFICE O/P EST MOD 30 MIN: CPT | Performed by: PLASTIC SURGERY

## 2024-09-18 ENCOUNTER — ANESTHESIA EVENT (OUTPATIENT)
Dept: PERIOP | Facility: AMBULARY SURGERY CENTER | Age: 24
End: 2024-09-18
Payer: MEDICARE

## 2024-09-23 NOTE — PRE-PROCEDURE INSTRUCTIONS
Pre-Surgery Instructions:   Medication Instructions    ibuprofen (MOTRIN) 800 mg tablet Stop taking 7 days prior to surgery.    Medication instructions for day surgery reviewed. Please use only a sip of water to take your instructed medications. Avoid aspirin and all over the counter vitamins, supplements and NSAIDS for one week prior to surgery per anesthesia guidelines. Tylenol is ok to take as needed.     You will receive a call one business day prior to surgery with an arrival time and hospital directions. If your surgery is scheduled on a Monday, the hospital will be calling you on the Friday prior to your surgery. If you have not heard from anyone by 8pm, please call the hospital supervisor through the hospital  at [phone]. ([place] [phone] or [place] [phone]).    Do not eat or drink anything after midnight the night before your surgery, including candy, mints, lifesavers, or chewing gum. Do not drink alcohol 24hrs before your surgery. Try not to smoke at least 24hrs before your surgery.   No medical marijuana morning of sx.     Follow the pre surgery showering instructions as listed in the “My Surgical Experience Booklet” or otherwise provided by your surgeon's office. Do not use a blade to shave the surgical area 1 week before surgery. It is okay to use a clean electric clippers up to 24 hours before surgery. Do not apply any lotions, creams, including makeup, cologne, deodorant, or perfumes after showering on the day of your surgery. Do not use dry shampoo, hair spray, hair gel, or any type of hair products.     No contact lenses, eye make-up, or artificial eyelashes. Remove nail polish, including gel polish, and any artificial, gel, or acrylic nails if possible. Remove all jewelry including rings and body piercing jewelry.     Wear causal clothing that is easy to take on and off. Consider your type of surgery.    Keep any valuables, jewelry, piercings at home. Please bring any  specially ordered equipment (sling, braces) if indicated.    Arrange for a responsible person to drive you to and from the hospital on the day of your surgery. Please confirm the visitor policy for the day of your procedure when you receive your phone call with an arrival time.     Call the surgeon's office with any new illnesses, exposures, or additional questions prior to surgery.    Please reference your “My Surgical Experience Booklet” for additional information to prepare for your upcoming surgery.

## 2024-10-02 ENCOUNTER — HOSPITAL ENCOUNTER (OUTPATIENT)
Facility: AMBULARY SURGERY CENTER | Age: 24
Setting detail: OUTPATIENT SURGERY
Discharge: HOME | End: 2024-10-02
Attending: PLASTIC SURGERY | Admitting: PLASTIC SURGERY
Payer: MEDICARE

## 2024-10-02 ENCOUNTER — ANESTHESIA (OUTPATIENT)
Dept: PERIOP | Facility: AMBULARY SURGERY CENTER | Age: 24
End: 2024-10-02
Payer: MEDICARE

## 2024-10-02 VITALS
OXYGEN SATURATION: 96 % | HEART RATE: 71 BPM | WEIGHT: 171 LBS | HEIGHT: 59 IN | BODY MASS INDEX: 34.47 KG/M2 | DIASTOLIC BLOOD PRESSURE: 77 MMHG | SYSTOLIC BLOOD PRESSURE: 119 MMHG | RESPIRATION RATE: 20 BRPM | TEMPERATURE: 97 F

## 2024-10-02 DIAGNOSIS — L73.2 HIDRADENITIS AXILLARIS: Primary | ICD-10-CM

## 2024-10-02 DIAGNOSIS — Z87.2 H/O HIDRADENITIS SUPPURATIVA: ICD-10-CM

## 2024-10-02 LAB
EXT PREGNANCY TEST URINE: NEGATIVE
EXT. CONTROL: NORMAL

## 2024-10-02 PROCEDURE — NC001 PR NO CHARGE: Performed by: PLASTIC SURGERY

## 2024-10-02 PROCEDURE — 11451 EXC SKN HDRDNT AX COMPLEX: CPT | Performed by: PLASTIC SURGERY

## 2024-10-02 PROCEDURE — 88304 TISSUE EXAM BY PATHOLOGIST: CPT | Performed by: PATHOLOGY

## 2024-10-02 PROCEDURE — 11451 EXC SKN HDRDNT AX COMPLEX: CPT

## 2024-10-02 PROCEDURE — 14302 TIS TRNFR ADDL 30 SQ CM: CPT | Performed by: PLASTIC SURGERY

## 2024-10-02 PROCEDURE — 14302 TIS TRNFR ADDL 30 SQ CM: CPT

## 2024-10-02 PROCEDURE — 14301 TIS TRNFR ANY 30.1-60 SQ CM: CPT

## 2024-10-02 PROCEDURE — 14301 TIS TRNFR ANY 30.1-60 SQ CM: CPT | Performed by: PLASTIC SURGERY

## 2024-10-02 PROCEDURE — 81025 URINE PREGNANCY TEST: CPT | Performed by: PLASTIC SURGERY

## 2024-10-02 RX ORDER — MIDAZOLAM HYDROCHLORIDE 2 MG/2ML
INJECTION, SOLUTION INTRAMUSCULAR; INTRAVENOUS AS NEEDED
Status: DISCONTINUED | OUTPATIENT
Start: 2024-10-02 | End: 2024-10-02

## 2024-10-02 RX ORDER — SODIUM CHLORIDE, SODIUM LACTATE, POTASSIUM CHLORIDE, CALCIUM CHLORIDE 600; 310; 30; 20 MG/100ML; MG/100ML; MG/100ML; MG/100ML
50 INJECTION, SOLUTION INTRAVENOUS CONTINUOUS
Status: DISCONTINUED | OUTPATIENT
Start: 2024-10-02 | End: 2024-10-02 | Stop reason: HOSPADM

## 2024-10-02 RX ORDER — ACETAMINOPHEN 500 MG
1000 TABLET ORAL EVERY 6 HOURS PRN
Qty: 50 TABLET | Refills: 0 | Status: SHIPPED | OUTPATIENT
Start: 2024-10-02 | End: 2024-10-12

## 2024-10-02 RX ORDER — HYDROMORPHONE HCL/PF 1 MG/ML
0.5 SYRINGE (ML) INJECTION
Status: DISCONTINUED | OUTPATIENT
Start: 2024-10-02 | End: 2024-10-02 | Stop reason: HOSPADM

## 2024-10-02 RX ORDER — FENTANYL CITRATE 50 UG/ML
INJECTION, SOLUTION INTRAMUSCULAR; INTRAVENOUS AS NEEDED
Status: DISCONTINUED | OUTPATIENT
Start: 2024-10-02 | End: 2024-10-02

## 2024-10-02 RX ORDER — ACETAMINOPHEN 325 MG/1
975 TABLET ORAL ONCE
Status: COMPLETED | OUTPATIENT
Start: 2024-10-02 | End: 2024-10-02

## 2024-10-02 RX ORDER — LIDOCAINE HYDROCHLORIDE AND EPINEPHRINE 10; 10 MG/ML; UG/ML
INJECTION, SOLUTION INFILTRATION; PERINEURAL AS NEEDED
Status: DISCONTINUED | OUTPATIENT
Start: 2024-10-02 | End: 2024-10-02 | Stop reason: HOSPADM

## 2024-10-02 RX ORDER — PROPOFOL 10 MG/ML
INJECTION, EMULSION INTRAVENOUS AS NEEDED
Status: DISCONTINUED | OUTPATIENT
Start: 2024-10-02 | End: 2024-10-02

## 2024-10-02 RX ORDER — KETAMINE HCL IN NACL, ISO-OSM 100MG/10ML
SYRINGE (ML) INJECTION AS NEEDED
Status: DISCONTINUED | OUTPATIENT
Start: 2024-10-02 | End: 2024-10-02

## 2024-10-02 RX ORDER — FENTANYL CITRATE/PF 50 MCG/ML
50 SYRINGE (ML) INJECTION
Status: DISCONTINUED | OUTPATIENT
Start: 2024-10-02 | End: 2024-10-02 | Stop reason: HOSPADM

## 2024-10-02 RX ORDER — DEXAMETHASONE SODIUM PHOSPHATE 10 MG/ML
INJECTION, SOLUTION INTRAMUSCULAR; INTRAVENOUS AS NEEDED
Status: DISCONTINUED | OUTPATIENT
Start: 2024-10-02 | End: 2024-10-02

## 2024-10-02 RX ORDER — MAGNESIUM HYDROXIDE 1200 MG/15ML
LIQUID ORAL AS NEEDED
Status: DISCONTINUED | OUTPATIENT
Start: 2024-10-02 | End: 2024-10-02 | Stop reason: HOSPADM

## 2024-10-02 RX ORDER — ONDANSETRON 2 MG/ML
INJECTION INTRAMUSCULAR; INTRAVENOUS AS NEEDED
Status: DISCONTINUED | OUTPATIENT
Start: 2024-10-02 | End: 2024-10-02

## 2024-10-02 RX ORDER — ONDANSETRON 2 MG/ML
4 INJECTION INTRAMUSCULAR; INTRAVENOUS ONCE AS NEEDED
Status: COMPLETED | OUTPATIENT
Start: 2024-10-02 | End: 2024-10-02

## 2024-10-02 RX ORDER — ACETAMINOPHEN 325 MG/1
975 TABLET ORAL EVERY 6 HOURS PRN
Status: DISCONTINUED | OUTPATIENT
Start: 2024-10-02 | End: 2024-10-02 | Stop reason: HOSPADM

## 2024-10-02 RX ORDER — GINSENG 100 MG
CAPSULE ORAL AS NEEDED
Status: DISCONTINUED | OUTPATIENT
Start: 2024-10-02 | End: 2024-10-02 | Stop reason: HOSPADM

## 2024-10-02 RX ORDER — TRAMADOL HYDROCHLORIDE 50 MG/1
50 TABLET ORAL EVERY 6 HOURS PRN
Status: DISCONTINUED | OUTPATIENT
Start: 2024-10-02 | End: 2024-10-02 | Stop reason: HOSPADM

## 2024-10-02 RX ORDER — KETOROLAC TROMETHAMINE 30 MG/ML
INJECTION, SOLUTION INTRAMUSCULAR; INTRAVENOUS AS NEEDED
Status: DISCONTINUED | OUTPATIENT
Start: 2024-10-02 | End: 2024-10-02

## 2024-10-02 RX ORDER — CEFAZOLIN SODIUM 1 G/50ML
1000 SOLUTION INTRAVENOUS ONCE
Status: COMPLETED | OUTPATIENT
Start: 2024-10-02 | End: 2024-10-02

## 2024-10-02 RX ORDER — LIDOCAINE HYDROCHLORIDE 10 MG/ML
INJECTION, SOLUTION EPIDURAL; INFILTRATION; INTRACAUDAL; PERINEURAL AS NEEDED
Status: DISCONTINUED | OUTPATIENT
Start: 2024-10-02 | End: 2024-10-02

## 2024-10-02 RX ORDER — ONDANSETRON 4 MG/1
4 TABLET, ORALLY DISINTEGRATING ORAL EVERY 6 HOURS PRN
Status: DISCONTINUED | OUTPATIENT
Start: 2024-10-02 | End: 2024-10-02 | Stop reason: HOSPADM

## 2024-10-02 RX ADMIN — ONDANSETRON 4 MG: 2 INJECTION INTRAMUSCULAR; INTRAVENOUS at 16:03

## 2024-10-02 RX ADMIN — FENTANYL CITRATE 50 MCG: 50 INJECTION INTRAMUSCULAR; INTRAVENOUS at 15:13

## 2024-10-02 RX ADMIN — KETOROLAC TROMETHAMINE 30 MG: 30 INJECTION, SOLUTION INTRAMUSCULAR; INTRAVENOUS at 15:33

## 2024-10-02 RX ADMIN — CEFAZOLIN SODIUM 2000 MG: 1 SOLUTION INTRAVENOUS at 15:00

## 2024-10-02 RX ADMIN — SODIUM CHLORIDE, SODIUM LACTATE, POTASSIUM CHLORIDE, AND CALCIUM CHLORIDE: .6; .31; .03; .02 INJECTION, SOLUTION INTRAVENOUS at 12:00

## 2024-10-02 RX ADMIN — FENTANYL CITRATE 50 MCG: 50 INJECTION INTRAMUSCULAR; INTRAVENOUS at 14:55

## 2024-10-02 RX ADMIN — DEXAMETHASONE SODIUM PHOSPHATE 10 MG: 10 INJECTION, SOLUTION INTRAMUSCULAR; INTRAVENOUS at 14:59

## 2024-10-02 RX ADMIN — ACETAMINOPHEN 975 MG: 325 TABLET ORAL at 11:47

## 2024-10-02 RX ADMIN — LIDOCAINE HYDROCHLORIDE 50 MG: 10 INJECTION, SOLUTION EPIDURAL; INFILTRATION; INTRACAUDAL at 15:13

## 2024-10-02 RX ADMIN — ONDANSETRON 4 MG: 2 INJECTION INTRAMUSCULAR; INTRAVENOUS at 14:59

## 2024-10-02 RX ADMIN — Medication 20 MG: at 15:14

## 2024-10-02 RX ADMIN — MIDAZOLAM 2 MG: 1 INJECTION INTRAMUSCULAR; INTRAVENOUS at 14:50

## 2024-10-02 RX ADMIN — LIDOCAINE HYDROCHLORIDE 50 MG: 10 INJECTION, SOLUTION EPIDURAL; INFILTRATION; INTRACAUDAL at 14:55

## 2024-10-02 RX ADMIN — PROPOFOL 200 MG: 10 INJECTION, EMULSION INTRAVENOUS at 14:56

## 2024-10-02 NOTE — OP NOTE
OPERATIVE REPORT  PATIENT NAME: Parisa West    :  2000  MRN: [id number]  Pt Location: [facility] OR ROOM 06    SURGERY DATE: 10/2/2024    Surgeons and Role:     * Vernell Dover MD - Primary     * Ivis Alcantar PA-C - Assisting    Preop Diagnosis:  H/O hidradenitis suppurativa [Z87.2]  Hidradenitis axillaris [L73.2]    Post-Op Diagnosis Codes:     * H/O hidradenitis suppurativa [Z87.2]     * Hidradenitis axillaris [L73.2]    Procedure(s):  Left - Excision of left axillary hidradenitis    Specimen(s):  ID Type Source Tests Collected by Time Destination   1 : LEFT AXILLARY HIDRADENITIS Tissue Soft Tissue, Other TISSUE EXAM Vernell Dover MD 10/2/2024 1516        Estimated Blood Loss:   Minimal    Drains:  * No LDAs found *    Anesthesia Type:   General    Operative Indications:  H/O hidradenitis suppurativa [Z87.2]  Hidradenitis axillaris [L73.2]      Operative Findings:  See body of report      Complications:   None    Procedure and Technique:  Excision of left axillary hidradenitis with advancement flap closure, 91cm2.    The patient was identified in the preoperative holding area.  Preoperative markings were made.  Patient was then transferred to the operating room and placed supine on the operating table.  Venodyne boots were placed and activated.  All pressure points were appropriately and extensively padded.  After induction of general endotracheal anesthesia, the entire left axillary area was sterilely prepped and draped with ChloraPrep in usual fashion.  Next, the area of active hidradenitis and previous scar were then marked out in elliptical fashion measuring 12 x 3 cm in size.  The area was then infiltrated local anesthesia using 1% lidocaine with epinephrine, 20 cc was infiltrated circumferentially.  After allowing time for hemostasis and analgesia, the ellipse was then incised with a 15 blade scalpel and taken down to the skin and subcutaneous tissue.  The involved skin and previous scar  was then excised at the level of the deep subcutaneous tissue using electrocautery.  The tissue was removed en bloc and sent to pathology as specimen.  The wound was then copiously irrigated with normal saline.  Hemostasis was obtained electrocautery.  The defect then measured 13 x 4 cm in size.  Local advancement flap closure was then planned out over the axillary area and proximal arm area.  Skin flaps were then elevated using electrocautery at the level of the superficial fascia.  The axillary based flap measured 13 by by 4 cm in size.  The proximal arm flap measured 13 x 3 cm in size..  The flaps were advanced and then the defect was closed in layers.  The deep subcutaneous tissue and deep dermis were closed with 2-0 Vicryl sutures buried fashion.  The skin was closed with running 3-0 Monocryl in a running subcuticular fashion.  Total area of local advancement flap closure was 13 x 7 cm in size for a total of 91 cm².  The incision site was cleansed and dried.  Then bacitracin ointment was applied.  ABD pads and a surgical bra were placed.  Patient tolerated the procedure well.  There were no complications.  Patient was awakened from anesthesia and transferred to recovery room awake and in stable condition.  I was present for the entire procedure.  There was no qualified resident available for the case.   A physician assistant was required during the procedure for retraction, tissue handling, dissection and suturing.    Patient Disposition:  PACU              SIGNATURE: Vernell Dover MD  DATE: October 2, 2024  TIME: 3:33 PM

## 2024-10-02 NOTE — ANESTHESIA POSTPROCEDURE EVALUATION
Post-Op Assessment Note    CV Status:  Stable  Pain Score: 0    Pain management: adequate       Mental Status:  Alert and awake   Hydration Status:  Euvolemic   PONV Controlled:  Controlled   Airway Patency:  Patent     Post Op Vitals Reviewed: Yes    No anethesia notable event occurred.    Staff: Anesthesiologist, CRNA               BP   128/65   Temp   97   Pulse  75   Resp   12   SpO2   99

## 2024-10-02 NOTE — H&P
[facility]astic [facility]construct[facility]  [address], [address], [place] [address]  [phone]     Patient Identification: Parisa West is a 24 y.o. female      History of Present Illness: The patient is a 24 y.o.  year-old female  who presents to the office for pre-op visit regarding left axilla recurrent hidradenitis. Patient is previously known to our practice. She had hidradenitis excision of bilateral axilla 12/11/2017 by Dr. Burgos.   Patient states she has an area of recurrent hidradenitis within the left axilla. Since 2017, the area has occasionally drained fluid. Pt states she was seen at the [facility] on 6/17/24 for an incision and drainage of left axilla abscess. I&D was successful and she was discharged home on antibiotics.  She is interested in excision of hidradenitis and remaining scar. Patient denies factors contributing to poor wound healing such as diabetes, tobacco use, chronic steroid use, immunotherapy/chemotherapy drug use and blood thinning mediations. She denies any cardiac or pulmonary history. She admits to medical marijuana use.      Patient has no complaints at this time.        Medical History        Past Medical History:   Diagnosis Date    Anxiety       per grandmother, pt has panic attacks     Asthma      Depression      Hidradenitis suppurativa      Paronychia of toe      Psychiatric disorder       anxiety, depression         Surgical History         Past Surgical History:   Procedure Laterality Date    NC EXCISION HIDRADENITIS AXILLARY SMPL/INTRM RPR Bilateral 12/11/2017     Procedure: EXCISION HIDRADENITIS AXILLARY WITH COMPLEX CLOSURE;  Surgeon: Gonzalez Burgos MD;  Location: [facility];  Service: Plastics           Current Medications      Current Outpatient Medications:     ibuprofen (MOTRIN) 800 mg tablet, Take 1 tablet (800 mg total) by mouth every 8 (eight) hours as needed for mild pain or moderate pain Take with food, Disp: 12 tablet, Rfl: 0            "  Allergies   Allergen Reactions    Sulfa Antibiotics Hives      Family History         Family History   Problem Relation Age of Onset    No Known Problems Mother      Heart disease Father      Kidney disease Father      Liver disease Father      Early death Father      Breast cancer Maternal Grandmother      Skin cancer Maternal Grandmother      Uterine cancer Maternal Grandmother      Breast cancer Paternal Grandmother           Social History   Social History            Tobacco Use    Smoking status: Never    Smokeless tobacco: Never   Vaping Use    Vaping status: Some Days    Substances: THC (vapes medical THC)   Substance Use Topics    Alcohol use: Not Currently    Drug use: Yes       Types: Marijuana            Review of Systems  Constitutional: Denies fevers, chills or pain.  Skin: Denies any warmth, erythema, edema or mucopurulent drainage.      Physical Exam  /88   Pulse 74   Temp 98 °F (36.7 °C) (Temporal)   Resp 16   Ht 4' 11\" (1.499 m)   Wt 77.6 kg (171 lb)   LMP 06/24/2024 (Approximate) Comment: states irregular menses  SpO2 100%   BMI 34.54 kg/m²         Constitutional:AAOx3, well-developed, no distress. Pt is cooperative and pleasant.  Eyes: Pupils are equal, round, and reactive to light. EOM in tact. Clear conjunctiva  Cardiovascular: Normal rate, regular rhythm.  Pulmonary/Chest: Effort normal and breath sounds normal. No respiratory distress.  Abd: soft, NT  Neuro: Gait in tact. Reflexes and motor strength normal and symmetric. Cranial nerved 2-12 grossly intact  Psychiatric: Has appropriate behavior and thought process.   Extremities: Full ROM, no gross abnormalities.  Skin: Right axilla scar well healed, flat, no hyperpigmentation or wounds. Left axilla scar is raised, medial inferior portion of scar with a cystic structure. No active drainage. Superior to axilla is a small cystic structure as well that is inflamed. See media     Assessment and Plan:  The patient is an 24 y.o.  " year-old female who presents to the office for pre-op consultation for recurrent hidradenitis. She is S/p Excision Hidradenitis Axillary With Complex Closure - Bilateral  on 12/11/2017 by Dr. Burgos         -At today's visit discussed patient's history and bilateral axillas were examined. Right axilla scar is well healed. Left axillary scar has a cystic structure along the medial inferior portion. Scar is raised and firm.  -Discussed surgical options  with the patient, she is interested in revision of left axillary scar and excision of recurrent hidradenitis. Discussed risks of the procedure such as poor wound healing, scarring, and risk of hidradenitis recurrence. All details of the procedure were discussed at length with the patient including operative and recovery time, as well as, all potential risks, benefits, and alternatives. Risks discussed included, but were not limited to: infection, bleeding, scarring, hematoma, seroma, delayed, healing, partial or total graft loss, continued open wound, need for additional procedures. The patient noted her understanding and had opportunity for questions. All were answered. She desires to proceed. Informed consent obtained. She remains an appropriate candidate for surgery. Surgical risk factuors include: previous operative site, infectious underlying process, area of high tension.     -The patient is to call the office with any questions or concerns. All of the patient's questions were answered at this time and they agree with the plan of care.

## 2024-10-02 NOTE — ANESTHESIA PREPROCEDURE EVALUATION
Procedure:  Excision of left axillary hidradenitis (Left: Axilla)    Relevant Problems   NEURO/PSYCH   (+) Anxiety   (+) Depression      PULMONARY   (+) Exercise-induced asthma      Dermatology   (+) Hidradenitis suppurativa      Other   (+) Obesity   (+) Tonsil stone        Physical Exam    Airway    Mallampati score: II  TM Distance: >3 FB  Neck ROM: full     Dental   No notable dental hx     Cardiovascular  Cardiovascular exam normal    Pulmonary  Pulmonary exam normal     Other Findings  post-pubertal.      Anesthesia Plan  ASA Score- 2     Anesthesia Type- general with ASA Monitors.         Additional Monitors:     Airway Plan: LMA.           Plan Factors-Exercise tolerance (METS): >4 METS.    Chart reviewed.   Existing labs reviewed. Patient summary reviewed.    Patient is a current smoker.  Patient instructed to abstain from smoking on day of procedure. Patient did not smoke on day of surgery.            Induction- intravenous.    Postoperative Plan- Plan for postoperative opioid use.     Perioperative Resuscitation Plan - Level 1 - Full Code.       Informed Consent- Anesthetic plan and risks discussed with patient and mother.  I personally reviewed this patient with the CRNA. Discussed and agreed on the Anesthesia Plan with the CRNA..        Lab Results   Component Value Date    HGBA1C 5.0 07/26/2023       Lab Results   Component Value Date     07/28/2014    K 4.7 09/17/2024     09/17/2024    CO2 25 09/17/2024    ANIONGAP 8 07/28/2014    BUN 12 09/17/2024    CREATININE 0.57 09/17/2024    GLUCOSE 101 07/28/2014    GLUF 99 05/04/2018    CALCIUM 9.8 09/17/2024    AST 18 09/17/2024    ALT 14 09/17/2024    ALKPHOS 73 09/17/2024    PROT 8.5 (H) 07/28/2014    BILITOT 0.7 07/28/2014    EGFR 130 09/17/2024       Lab Results   Component Value Date    WBC 10.84 (H) 10/21/2018    HGB 14.3 10/21/2018    HCT 44.4 10/21/2018    MCV 84 10/21/2018     10/21/2018

## 2024-10-02 NOTE — DISCHARGE INSTR - AVS FIRST PAGE
Post-Op Discharge Instructions  Dr. Vernell Dover  [facility]  [address] [place] [address], [address], [facility] [address]  [phone]    You may shower in 24 hours. Please shower with your back facing the water. Gentley cleanse incision site with soap and water, do not scrub. Apt dry. Apply bacitracin twice daily.    Keep ABD padding or gauze covering the incision site to avoid friction from bra.    May ice as needed for swelling.    No strenuous activity or lifting over 10 lbs for a minimum of 4 weeks. No repetitive pushing, pulling or overhead arm motions.    Your first post-op appointment is scheduled for 10/8/2024 @ 10AM at the [facility].    Take following medications with a checkmark ([x]) as prescribed, and do not take any pain medication on an empty stomach.  [x]Tylenol 1000mg, every 6 hours as needed for pain control.  [x]Ibuprofen 800mg, every 4 hours as needed for pain control  Please do not take any vitamins, minerals, ibuprofen, hormonal drugs or immunologic drugs for 7 days post-op.    Resume any prior medications at home unless otherwise instructed by Dr. Dover    You must be off all pain medications and have a clear field of vision before driving.    For the next 24 hours:  a. Do not sign any legal documents or operate machinery.  b. Have a responsible adult help you.  c. Take it easy & rest.    Call Dr. Dover at the office [phone] if any:   a. Obvious bleeding, excessive swelling, or warmth at the site  b. Fever over 101.0°  c. Shortness of breath, severe calf or thigh pain.  d. Redness, odor, or pus at the wound. (Some oozing is normal from incision sites and may continue for several days)  e. Persistent vomiting or pain that is not relieved by your medication.

## 2024-10-08 ENCOUNTER — OFFICE VISIT (OUTPATIENT)
Dept: PLASTIC SURGERY | Facility: CLINIC | Age: 24
End: 2024-10-08

## 2024-10-08 DIAGNOSIS — L73.2 HIDRADENITIS: Primary | ICD-10-CM

## 2024-10-08 PROCEDURE — 88304 TISSUE EXAM BY PATHOLOGIST: CPT | Performed by: PATHOLOGY

## 2024-10-08 PROCEDURE — 99024 POSTOP FOLLOW-UP VISIT: CPT | Performed by: PHYSICIAN ASSISTANT

## 2024-10-08 NOTE — PROGRESS NOTES
Assessment/Plan:     Diagnoses and all orders for this visit:    Hidradenitis  She underwent excision of left axillary hidradenitis on 10/2 by Dr. Dover. Pathology is pending. Left axilla incision is C/D/I, ecchymosis, limited ROM of shoulder. Recommend shoulder ROM exercises. We will see her back in 1 week.         Subjective:      Patient ID: Parisa West is a 24 y.o. female.    HPI    Pt is here for a post-op visit. She underwent excision of left axillary hidradenitis on 10/2 by Dr. Dover. Pathology is pending. She complains of numbness in her left arm & pain with ROM.      Patient Active Problem List   Diagnosis    Hidradenitis    Cystic acne    Abnormal menses    Anxiety    Depression    Exercise-induced asthma    Irregular menstrual cycle    Acne    Hidradenitis suppurativa    Tonsil stone    Obesity    Hidradenitis axillaris    H/O hidradenitis suppurativa     Allergies   Allergen Reactions    Sulfa Antibiotics Hives     Current Outpatient Medications on File Prior to Visit   Medication Sig    acetaminophen (TYLENOL) 500 mg tablet Take 2 tablets (1,000 mg total) by mouth every 6 (six) hours as needed for mild pain for up to 10 days    ibuprofen (MOTRIN) 800 mg tablet Take 1 tablet (800 mg total) by mouth every 8 (eight) hours as needed for mild pain or moderate pain Take with food     No current facility-administered medications on file prior to visit.     Family History   Problem Relation Age of Onset    No Known Problems Mother     Heart disease Father     Kidney disease Father     Liver disease Father     Early death Father     Breast cancer Maternal Grandmother     Skin cancer Maternal Grandmother     Uterine cancer Maternal Grandmother     Breast cancer Paternal Grandmother      Past Medical History:   Diagnosis Date    Anxiety     per grandmother, pt has panic attacks     Asthma     exercise induced only as a teenager    Depression     Hidradenitis suppurativa     Paronychia of toe     Psychiatric  disorder     anxiety, depression     Social History     Socioeconomic History    Marital status: Single     Spouse name: Not on file    Number of children: Not on file    Years of education: Not on file    Highest education level: Not on file   Occupational History    Not on file   Tobacco Use    Smoking status: Never    Smokeless tobacco: Never   Vaping Use    Vaping status: Every Day    Substances: THC (vapes medical THC)   Substance and Sexual Activity    Alcohol use: Not Currently    Drug use: Yes     Frequency: 14.0 times per week     Types: Marijuana     Comment: medical marijuana    Sexual activity: Never   Other Topics Concern    Not on file   Social History Narrative    Not on file     Social Determinants of Health     Financial Resource Strain: Not on file   Food Insecurity: Not on file   Transportation Needs: Not on file   Physical Activity: Not on file   Stress: Not on file   Social Connections: Not on file   Intimate Partner Violence: Not on file   Housing Stability: Not on file     Past Surgical History:   Procedure Laterality Date    AXILLARY HIDRADENITIS EXCISION Left 10/2/2024    Procedure: Excision of left axillary hidradenitis;  Surgeon: Vernell Dover MD;  Location: [facility];  Service: Plastics    LASIK      AK EXCISION HIDRADENITIS AXILLARY SMPL/INTRM RPR Bilateral 12/11/2017    Procedure: EXCISION HIDRADENITIS AXILLARY WITH COMPLEX CLOSURE;  Surgeon: Gonzalez Burgos MD;  Location:  MAIN OR;  Service: Plastics         Review of Systems   All other systems reviewed and are negative.        Objective:      LMP 06/24/2024 (Approximate) Comment: states irregular menses         Physical Exam  Constitutional:       Appearance: Normal appearance. She is well-developed.   HENT:      Head: Normocephalic and atraumatic.   Eyes:      Conjunctiva/sclera: Conjunctivae normal.   Pulmonary:      Effort: Pulmonary effort is normal.   Musculoskeletal:      Cervical back: Normal range of motion.   Skin:      General: Skin is warm and dry.      Comments: Left axilla incision is C/D/I, ecchymosis, limited ROM of shoulder. See picture in media.    Neurological:      Mental Status: She is alert and oriented to person, place, and time.   Psychiatric:         Mood and Affect: Mood normal.         Behavior: Behavior normal.

## 2024-10-15 ENCOUNTER — OFFICE VISIT (OUTPATIENT)
Dept: PLASTIC SURGERY | Facility: CLINIC | Age: 24
End: 2024-10-15

## 2024-10-15 DIAGNOSIS — L73.2 HIDRADENITIS AXILLARIS: Primary | ICD-10-CM

## 2024-10-15 PROCEDURE — 99024 POSTOP FOLLOW-UP VISIT: CPT | Performed by: PLASTIC SURGERY

## 2024-10-15 NOTE — PROGRESS NOTES
[facility] Plastic and Reconstructive Surgery  [address], [address], [place] [address]  [phone]    Patient Identification: Parisa West is a 24 y.o. female     History of Present Illness: The patient is a 24 y.o.  year-old female  who presents to the office for postoperative follow up. Patient is 2 weeks s/p Excision of left axillary hidradenitis - Left  on 10/2/2024 by Dr. Dover.  Patient notes that she is doing well overall.  She is experiencing some mild tightness with movement of her left arm.    Review of Systems  Constitutional: Denies fevers, chills or pain.  Skin: Denies any warmth, erythema, edema  or mucopurulent drainage.     Physical Exam  Patient is alert, oriented x 3, NAD  Breathing comfortably on room air  Left axillary incision intact without dehiscence, erythema or drainage,  Axilla with moderate moisture/perspiration    Assessment and Plan:  The patient is a 24 y.o.  year-old female  who presents to the office for postoperative follow up. Patient is 2 weeks s/p Excision of left axillary hidradenitis - Left  on 10/2/2024 by Dr. Dover.  Patient notes that she is doing well overall.     -The patient is to return: 2 weeks  -Patient to keep incision clean and dry  -The patient is to call the office with any questions or concerns. All of the patient's questions were answered at this time and they agree with the plan of care.

## 2024-10-30 ENCOUNTER — OFFICE VISIT (OUTPATIENT)
Dept: PLASTIC SURGERY | Facility: CLINIC | Age: 24
End: 2024-10-30

## 2024-10-30 DIAGNOSIS — L73.2 HIDRADENITIS AXILLARIS: Primary | ICD-10-CM

## 2024-10-30 PROCEDURE — 99024 POSTOP FOLLOW-UP VISIT: CPT

## 2024-10-30 NOTE — PROGRESS NOTES
[facility]astic [facility] Reconstruct[facility]  [address], [address], [place] [address]  [phone]    Patient Identification: Parisa West is a 24 y.o. female     History of Present Illness: The patient is a 24 y.o. female who presents to the office for a post-op visit. Patient is 28 days s/p Excision of left axillary hidradenitis - Left on 10/2/2024 by Dr. Dover. Patient is doing well at this time. She is applying bacitracin to the incisional site daily. She reports mild tightness with movement of her left arm. Denies fever, chills, signs of infection, or significant pain. Patient has no complaints at this time.    Past Medical History:   Diagnosis Date    Anxiety     per grandmother, pt has panic attacks     Asthma     exercise induced only as a teenager    Depression     Hidradenitis suppurativa     Paronychia of toe     Psychiatric disorder     anxiety, depression          Review of Systems  Constitutional: Denies fevers, chills, or pain.  Skin: Denies any warmth, erythema, edema, or mucopurulent drainage.     Physical Exam    L axilla: Surgical incision is clean, dry, and intact. Spitting sutures noted. Skin perfusion is intact. Expected amount of post-operative edema. There are no signs of infection, obvious hematoma, seroma, or wound dehiscence.     Assessment and Plan:  The patient is a 24 y.o. female who presents to the office for a post-op visit. Patient is 28 days s/p Excision of left axillary hidradenitis - Left on 10/2/2024 by Dr. Dover.      -At today's visit, examined left axilla. Incisional site is healing appropriately. No concerns at this time.  -Discontinue bacitracin.   -Apply Aquaphor to incisional site 1x/day.  -Encouraged patient to perform daily scar massages to promote scar softening and flattening.  -The patient may return in 3 months or as needed. She would like to return PRN and will call with any concerns.  -The patient is to call the office with any questions or  concerns. All of the patient's questions were answered at this time and they agree with the plan of care.      RONY GibsonS

## 2024-11-05 ENCOUNTER — OFFICE VISIT (OUTPATIENT)
Dept: PLASTIC SURGERY | Facility: CLINIC | Age: 24
End: 2024-11-05

## 2024-11-05 DIAGNOSIS — L73.2 HIDRADENITIS AXILLARIS: Primary | ICD-10-CM

## 2024-11-05 PROCEDURE — 99024 POSTOP FOLLOW-UP VISIT: CPT

## 2024-11-07 NOTE — PROGRESS NOTES
[facility]astic and Reconstructive Surgery  [address], [address], [place] [address]  [phone]    Patient Identification: Parisa West is a 24 y.o. female     History of Present Illness: The patient is a 24 y.o. female who presents to the office for a post-op visit. Patient is 34 days s/p Excision of left axillary hidradenitis - Left on 10/2/2024 by Dr. Dover.  Patient reports area of drainage upon her incision site and called the office concerned for infection. She denies fevers, chills, or significant pain.    Past Medical History:   Diagnosis Date    Anxiety     per grandmother, pt has panic attacks     Asthma     exercise induced only as a teenager    Depression     Hidradenitis suppurativa     Paronychia of toe     Psychiatric disorder     anxiety, depression          Review of Systems  Constitutional: Denies fevers, chills, or pain.  Skin: Denies any warmth, erythema, or edema. Incisional drainage.     Physical Exam    L axilla: Surgical incision is clean, dry, and intact. Spitting suture noted with mild drainage.  There are no signs of infection, obvious hematoma, seroma, or wound dehiscence.     Assessment and Plan:  The patient is a 24 y.o. female who presents to the office for a post-op visit. Patient is 28 days s/p Excision of left axillary hidradenitis - Left on 10/2/2024 by Dr. Dover.      -At today's visit, examined left axilla. Spitting suture removed. Remainder of incision site is healing appropriately.   -Apply bacitracin to area 1x/day.  -Encouraged patient to perform daily scar massages to promote scar softening and flattening.  -The patient may return in 3 months or as needed. She would like to return PRN and will call with any concerns.  -The patient is to call the office with any questions or concerns. All of the patient's questions were answered at this time and they agree with the plan of care.    Ivis Alcantar PA-C  Plastic and Reconstructive Surgery

## (undated) DEVICE — ABDOMINAL PAD: Brand: DERMACEA

## (undated) DEVICE — PROXIMATE SKIN STAPLERS (35 WIDE) CONTAINS 35 STAINLESS STEEL STAPLES (FIXED HEAD): Brand: PROXIMATE

## (undated) DEVICE — SUT MONOCRYL 4-0 PS-2 27 IN Y426H

## (undated) DEVICE — PACK UNIVERSAL DRAPES SUB-Q ICD

## (undated) DEVICE — X-RAY DETECTABLE SPONGES,16 PLY: Brand: VISTEC

## (undated) DEVICE — INTENDED FOR TISSUE SEPARATION, AND OTHER PROCEDURES THAT REQUIRE A SHARP SURGICAL BLADE TO PUNCTURE OR CUT.: Brand: BARD-PARKER ® CARBON RIB-BACK BLADES

## (undated) DEVICE — 10FR FRAZIER SUCTION HANDLE: Brand: CARDINAL HEALTH

## (undated) DEVICE — SUT MONOCRYL 3-0 SH 27 IN Y416H

## (undated) DEVICE — GLOVE SRG BIOGEL 6.5

## (undated) DEVICE — TUBING SUCTION 5MM X 12 FT

## (undated) DEVICE — BETHLEHEM UNIVERSAL OUTPATIENT: Brand: CARDINAL HEALTH

## (undated) DEVICE — DRAPE TOWEL: Brand: CONVERTORS

## (undated) DEVICE — STRL UNIVERSAL MINOR GENERAL: Brand: CARDINAL HEALTH

## (undated) DEVICE — 2000CC GUARDIAN II: Brand: GUARDIAN

## (undated) DEVICE — GAUZE SPONGES,16 PLY: Brand: CURITY

## (undated) DEVICE — NEEDLE 25G X 1 1/2

## (undated) DEVICE — GLOVE INDICATOR PI UNDERGLOVE SZ 7 BLUE

## (undated) DEVICE — GLOVE INDICATOR PI UNDERGLOVE SZ 8 BLUE

## (undated) DEVICE — SUT ETHILON 3-0 PS-1 18 IN 1663H

## (undated) DEVICE — PLUMEPEN PRO 10FT

## (undated) DEVICE — SUT ETHILON 4-0 PS-2 18 IN 1667H

## (undated) DEVICE — SPONGE LAP 18 X 18 IN

## (undated) DEVICE — SUT MONOCRYL 2-0 SH 27 IN Y317H

## (undated) DEVICE — OCCLUSIVE GAUZE STRIP,3% BISMUTH TRIBROMOPHENATE IN PETROLATUM BLEND: Brand: XEROFORM

## (undated) DEVICE — GLOVE SRG BIOGEL 7

## (undated) DEVICE — DRESSING XEROFORM 5 X 9

## (undated) DEVICE — GLOVE SRG BIOGEL ECLIPSE 7.5

## (undated) DEVICE — NEEDLE 25G X 5/8

## (undated) DEVICE — ELECTRODE BLADE MOD E-Z CLEAN 2.5IN 6.4CM -0012M

## (undated) DEVICE — SUT SILK 3-0 SH 30 IN K832H

## (undated) DEVICE — MICRODISSECTION NEEDLE STRAIGHT SLEEVE: Brand: COLORADO

## (undated) DEVICE — CHLORAPREP HI-LITE 26ML ORANGE